# Patient Record
Sex: MALE | Race: WHITE
[De-identification: names, ages, dates, MRNs, and addresses within clinical notes are randomized per-mention and may not be internally consistent; named-entity substitution may affect disease eponyms.]

---

## 2018-06-02 ENCOUNTER — HOSPITAL ENCOUNTER (EMERGENCY)
Dept: HOSPITAL 76 - ED | Age: 33
Discharge: HOME | End: 2018-06-02
Payer: MEDICAID

## 2018-06-02 VITALS — SYSTOLIC BLOOD PRESSURE: 143 MMHG | DIASTOLIC BLOOD PRESSURE: 82 MMHG

## 2018-06-02 DIAGNOSIS — H10.32: Primary | ICD-10-CM

## 2018-06-02 DIAGNOSIS — H68.103: ICD-10-CM

## 2018-06-02 DIAGNOSIS — F17.200: ICD-10-CM

## 2018-06-02 PROCEDURE — 99283 EMERGENCY DEPT VISIT LOW MDM: CPT

## 2018-06-02 PROCEDURE — 99282 EMERGENCY DEPT VISIT SF MDM: CPT

## 2018-06-02 NOTE — ED PHYSICIAN DOCUMENTATION
History of Present Illness





- Stated complaint


Stated Complaint: EYE REDNESS





- Chief complaint


Chief Complaint: Heent





- Additonal information


Additional information: 





hx from pt


red injected L eye with dc this AM


kids in the house with ink eye


and he touched his eyes the other days to pop a pimple on the eyelid


some congestion too





Review of Systems


Constitutional: denies: Fever, Chills


Eyes: reports: Discharge, Irritation


Ears: denies: Loss of hearing (dec 2/2 congestion)


Nose: reports: Congestion





PD PAST MEDICAL HISTORY





- Past Medical History


Past Medical History: No





- Past Surgical History


Past Surgical History: No





- Present Medications


Home Medications: 


 Ambulatory Orders











 Medication  Instructions  Recorded  Confirmed


 


Erythromycin Base [Erythromycin] 1 applic OP Q4H #1 tub 06/02/18 


 


Fluticasone [Flonase] 1 sprays GERMAIN DAILY #1 bottle 06/02/18 














- Allergies


Allergies/Adverse Reactions: 


 Allergies











Allergy/AdvReac Type Severity Reaction Status Date / Time


 


meperidine [From Demerol] Allergy  Hallucinati Verified 06/02/18 08:55





   ons  














- Social History


Does the pt smoke?: Yes


Smoking Status: Current every day smoker


Does the pt drink ETOH?: No


Does the pt have substance abuse?: No





- Immunizations


Immunizations are current?: Yes





PD ED PE NORMAL





- Vitals


Vital signs reviewed: Yes





- General


General: Alert and oriented X 3





- HEENT


HEENT: PERRL, Other (L eye injected with dc, no FB).  No: Ears normal (TM 

retracted and dull s erythema)





- Neck


Neck: Supple, no meningeal sign





- Cardiac


Cardiac: RRR





- Respiratory


Respiratory: No respiratory distress, Clear bilaterally





Results





- Vitals


Vitals: 





 Vital Signs - 24 hr











  06/02/18





  08:48


 


Temperature 36.3 C L


 


Heart Rate 94


 


Respiratory 18





Rate 


 


Blood Pressure 143/82 H


 


O2 Saturation 100








 Oxygen











O2 Source                      Room air

















PD MEDICAL DECISION MAKING





- ED course


ED course: 





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





Departure





- Departure


Disposition: 01 Home, Self Care


Clinical Impression: 


Conjunctivitis


Qualifiers:


 Conjunctivitis type: acute Acute conjunctivitis type: unspecified Laterality: 

left Qualified Code(s): H10.32 - Unspecified acute conjunctivitis, left eye





Eustachian tube obstruction


Qualifiers:


 Laterality: bilateral Qualified Code(s): H68.103 - Unspecified obstruction of 

Eustachian tube, bilateral





Condition: Good


Instructions:  ED Conjunctivitis Nonspecific, ED Otitis Media Serous Adult


Prescriptions: 


Erythromycin Base [Erythromycin] 1 applic OP Q4H #1 tub


Fluticasone [Flonase] 1 sprays GERMAIN DAILY #1 bottle


Comments: 


The eye infection may be bacterial or may be viral. If the infection does not 

clear in a few days, start the antibiotic ointment


Forms:  Activity restrictions